# Patient Record
Sex: MALE | Race: WHITE | URBAN - METROPOLITAN AREA
[De-identification: names, ages, dates, MRNs, and addresses within clinical notes are randomized per-mention and may not be internally consistent; named-entity substitution may affect disease eponyms.]

---

## 2020-12-17 ENCOUNTER — OFFICE VISIT (OUTPATIENT)
Dept: URBAN - METROPOLITAN AREA CLINIC 27 | Facility: CLINIC | Age: 60
End: 2020-12-17
Payer: MEDICARE

## 2020-12-17 DIAGNOSIS — H35.371 PUCKERING OF MACULA, RIGHT EYE: ICD-10-CM

## 2020-12-17 PROCEDURE — 67028 INJECTION EYE DRUG: CPT | Performed by: OPHTHALMOLOGY

## 2020-12-17 PROCEDURE — 92012 INTRM OPH EXAM EST PATIENT: CPT | Performed by: OPHTHALMOLOGY

## 2020-12-17 PROCEDURE — 92134 CPTRZ OPH DX IMG PST SGM RTA: CPT | Performed by: OPHTHALMOLOGY

## 2020-12-17 ASSESSMENT — INTRAOCULAR PRESSURE
OD: 17
OS: 18

## 2021-03-18 ENCOUNTER — OFFICE VISIT (OUTPATIENT)
Dept: URBAN - METROPOLITAN AREA CLINIC 27 | Facility: CLINIC | Age: 61
End: 2021-03-18
Payer: MEDICARE

## 2021-03-18 PROCEDURE — 67028 INJECTION EYE DRUG: CPT | Performed by: OPHTHALMOLOGY

## 2021-03-18 PROCEDURE — 99213 OFFICE O/P EST LOW 20 MIN: CPT | Performed by: OPHTHALMOLOGY

## 2021-03-18 PROCEDURE — 92134 CPTRZ OPH DX IMG PST SGM RTA: CPT | Performed by: OPHTHALMOLOGY

## 2021-03-18 ASSESSMENT — INTRAOCULAR PRESSURE
OS: 15
OD: 14

## 2021-03-18 NOTE — IMPRESSION/PLAN
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
s/p Avastin x 11 OS, last 12/17/2020
s/p PRP fill in OS 06/06/19 Plan: Exam and OCT confirm persistent center-involved DME OS s/p Avastin x 11. CRT increased to 413 microns (from 401 microns). PRP OS complete. There is stable, mild DME OD. The diagnosis, natural history, and prognosis of PDR with DME, as well as the R/B/A of PRP, anti-VEGF, vitrectomy, and observation were discussed. The patient elects Avastin OS - performed without complication. BS/BP/lipid control discussed. Last A1C 7.9 RTC 3 months for OCT OU re-eval

## 2021-06-15 ENCOUNTER — OFFICE VISIT (OUTPATIENT)
Dept: URBAN - METROPOLITAN AREA CLINIC 27 | Facility: CLINIC | Age: 61
End: 2021-06-15
Payer: MEDICARE

## 2021-06-15 PROCEDURE — 92134 CPTRZ OPH DX IMG PST SGM RTA: CPT | Performed by: OPHTHALMOLOGY

## 2021-06-15 PROCEDURE — 67028 INJECTION EYE DRUG: CPT | Performed by: OPHTHALMOLOGY

## 2021-06-15 PROCEDURE — 99214 OFFICE O/P EST MOD 30 MIN: CPT | Performed by: OPHTHALMOLOGY

## 2021-06-15 ASSESSMENT — INTRAOCULAR PRESSURE
OS: 14
OD: 13

## 2021-06-15 NOTE — IMPRESSION/PLAN
Impression: Type 2 diab with prolif diab rtnop with macular edema, bi: [id number] OU.
s/p Avastin x 12 OS, last 3/18/21
s/p PRP fill in OS 06/06/19 Plan: Exam and OCT confirm mild VH OS and worsening center-involved DME OS s/p Avastin x 11. CRT increased to 459 microns (from 413 microns). PRP OS complete. There is stable, mild DME OD. The diagnosis, natural history, and prognosis of PDR with DME, as well as the R/B/A of PRP, anti-VEGF, vitrectomy, and observation were discussed. The patient elects Avastin OS - performed without complication. BS/BP/lipid control discussed. Last A1C 7.9 RTC 2 months for OCT OU re-eval Avastin

## 2021-08-10 ENCOUNTER — OFFICE VISIT (OUTPATIENT)
Dept: URBAN - METROPOLITAN AREA CLINIC 27 | Facility: CLINIC | Age: 61
End: 2021-08-10
Payer: COMMERCIAL

## 2021-08-10 DIAGNOSIS — H25.13 AGE-RELATED NUCLEAR CATARACT, BILATERAL: ICD-10-CM

## 2021-08-10 DIAGNOSIS — E11.3513 TYPE 2 DIABETES MELLITUS WITH PROLIFERATIVE DIABETIC RETINOPATHY WITH MACULAR EDEMA, BILATERAL: Primary | ICD-10-CM

## 2021-08-10 PROCEDURE — 92134 CPTRZ OPH DX IMG PST SGM RTA: CPT | Performed by: OPHTHALMOLOGY

## 2021-08-10 PROCEDURE — 67028 INJECTION EYE DRUG: CPT | Performed by: OPHTHALMOLOGY

## 2021-08-10 ASSESSMENT — INTRAOCULAR PRESSURE
OS: 15
OD: 14

## 2021-08-10 NOTE — IMPRESSION/PLAN
Impression: Type 2 diab with prolif diab rtnop with macular edema, bi: [id number] OU.
s/p Avastin x 13 OS, last 6/15/21
s/p PRP fill in OS 06/06/19
last full DFE OU 6/15/21 Plan: Exam and OCT confirm improving VH OS and persistent, center-involved DME OS s/p Avastin x 11.  microns (from 459 microns). PRP OS complete. There is stable, mild DME OD. The diagnosis, natural history, and prognosis of PDR with DME, as well as the R/B/A of PRP, anti-VEGF, vitrectomy, and observation were discussed. The patient elects Avastin OS - performed without complication. If no improvement at follow-up, will try Eylea. BS/BP/lipid control discussed. Last A1C 7.5 RTC 7 weeks for OCT OU re-eval Avastin vs Eylea

## 2021-09-30 ENCOUNTER — OFFICE VISIT (OUTPATIENT)
Dept: URBAN - METROPOLITAN AREA CLINIC 41 | Facility: CLINIC | Age: 61
End: 2021-09-30
Payer: COMMERCIAL

## 2021-09-30 PROCEDURE — 92134 CPTRZ OPH DX IMG PST SGM RTA: CPT | Performed by: OPHTHALMOLOGY

## 2021-09-30 PROCEDURE — 67028 INJECTION EYE DRUG: CPT | Performed by: OPHTHALMOLOGY

## 2021-09-30 ASSESSMENT — INTRAOCULAR PRESSURE
OD: 10
OS: 15

## 2021-09-30 NOTE — IMPRESSION/PLAN
Impression: Type 2 diab with prolif diab rtnop with macular edema, bi: [id number] OU.
s/p Avastin x 14 OS, last 8/10/21
s/p PRP fill in OS 06/06/19
last full DFE OU 6/15/21 Plan: Exam and OCT confirm recurrent VH and new-onset [place] OS. There is persistent, center-involved DME OS s/p Avastin x 14.  microns (stable). PRP OS complete. There is stable, mild DME OD. The diagnosis, natural history, and prognosis of PDR with DME, as well as the R/B/A of PRP, anti-VEGF, vitrectomy, and observation were discussed. The patient elects Avastin OS - performed without complication. If no improvement at follow-up, will try Eylea. BS/BP/lipid control discussed. Last A1C 7.4 RTC 5 weeks for OCT OU re-eval Avastin vs Eylea

## 2021-11-11 ENCOUNTER — OFFICE VISIT (OUTPATIENT)
Dept: URBAN - METROPOLITAN AREA CLINIC 41 | Facility: CLINIC | Age: 61
End: 2021-11-11
Payer: COMMERCIAL

## 2021-11-11 PROCEDURE — 67028 INJECTION EYE DRUG: CPT | Performed by: OPHTHALMOLOGY

## 2021-11-11 PROCEDURE — 92134 CPTRZ OPH DX IMG PST SGM RTA: CPT | Performed by: OPHTHALMOLOGY

## 2021-11-11 ASSESSMENT — INTRAOCULAR PRESSURE
OD: 12
OS: 17

## 2021-11-11 NOTE — IMPRESSION/PLAN
Impression: Type 2 diab with prolif diab rtnop with macular edema, bi: [id number] OU.
s/p Avastin x 15 OS, last 9/30/21
s/p PRP fill in OS 06/06/19
last full DFE OU 6/15/21 Plan: Exam and OCT confirm improving VH and stable [place] OS. There is persistent, center-involved DME OS s/p Avastin x 15.  (from 465 microns). PRP OS complete. There is stable, mild DME OD. The diagnosis, natural history, and prognosis of PDR with DME, as well as the R/B/A of PRP, anti-VEGF, vitrectomy, and observation were discussed. The patient elects Eylea #1 OS - performed without complication. BS/BP/lipid control discussed. Last A1C 7.4 RTC 6 weeks for OCT OU re-eval Avastin vs Eylea, DFE OU (semiannual)

## 2021-12-23 ENCOUNTER — OFFICE VISIT (OUTPATIENT)
Dept: URBAN - METROPOLITAN AREA CLINIC 41 | Facility: CLINIC | Age: 61
End: 2021-12-23
Payer: COMMERCIAL

## 2021-12-23 PROCEDURE — 92134 CPTRZ OPH DX IMG PST SGM RTA: CPT | Performed by: OPHTHALMOLOGY

## 2021-12-23 PROCEDURE — 67028 INJECTION EYE DRUG: CPT | Performed by: OPHTHALMOLOGY

## 2021-12-23 ASSESSMENT — INTRAOCULAR PRESSURE
OD: 17
OS: 14

## 2021-12-23 NOTE — IMPRESSION/PLAN
Impression: Type 2 diab with prolif diab rtnop with macular edema, bi: [id number] OU.
s/p Avastin x 15 OS, last 9/30/21
s/p PRP fill in OS 06/06/19
s/p Eylea x 1 OS, last 11/11/21
last full DFE OU 6/15/21 Plan: Both eyes are due for full dilated semiannual exam today. Exam and OCT confirm improving VH and [place] OS. There is improving, center-involved DME OS s/p Eylea x 1.  (from 405 microns). PRP OS complete. There is stable, mild DME OD. The diagnosis, natural history, and prognosis of PDR with DME, as well as the R/B/A of PRP, anti-VEGF, vitrectomy, and observation were discussed. The patient elects Eylea #2 OS - performed without complication. BS/BP/lipid control discussed. Last A1C 7.1 RTC 6 weeks for OCT OU Eylea OS #2/3 (straight) (new series)

## 2022-02-03 ENCOUNTER — PROCEDURE (OUTPATIENT)
Dept: URBAN - METROPOLITAN AREA CLINIC 41 | Facility: CLINIC | Age: 62
End: 2022-02-03
Payer: COMMERCIAL

## 2022-02-03 PROCEDURE — 67028 INJECTION EYE DRUG: CPT | Performed by: OPHTHALMOLOGY

## 2022-02-03 PROCEDURE — 92134 CPTRZ OPH DX IMG PST SGM RTA: CPT | Performed by: OPHTHALMOLOGY

## 2022-02-03 ASSESSMENT — INTRAOCULAR PRESSURE
OS: 15
OD: 14

## 2022-02-03 NOTE — IMPRESSION/PLAN
Impression: Type 2 diab with prolif diab rtnop with macular edema, bi: [id number] OU.
s/p Avastin x 15 OS, last 9/30/21
s/p PRP fill in OS 06/06/19
s/p Eylea x 2 OS, last 12/23/21 Plan: OCT: improving, center-involved DME OS s/p Eylea x 1.  (from 405 microns). PRP OS complete. There is stable, mild DME OD. The diagnosis, natural history, and prognosis of PDR with DME, as well as the R/B/A of PRP, anti-VEGF, vitrectomy, and observation were discussed. The patient elects Eylea #3 OS - performed without complication. BS/BP/lipid control discussed. Last A1C 7.1 RTC 6 weeks for OCT OU Eylea OS #3/3 (straight) (new series)

## 2022-03-17 ENCOUNTER — PROCEDURE (OUTPATIENT)
Dept: URBAN - METROPOLITAN AREA CLINIC 41 | Facility: CLINIC | Age: 62
End: 2022-03-17
Payer: COMMERCIAL

## 2022-03-17 PROCEDURE — 92134 CPTRZ OPH DX IMG PST SGM RTA: CPT | Performed by: OPHTHALMOLOGY

## 2022-03-17 PROCEDURE — 67028 INJECTION EYE DRUG: CPT | Performed by: OPHTHALMOLOGY

## 2022-03-17 ASSESSMENT — INTRAOCULAR PRESSURE
OD: 14
OS: 13

## 2022-03-17 NOTE — IMPRESSION/PLAN
Impression: Type 2 diab with prolif diab rtnop with macular edema, bi: [id number] OU.
s/p Avastin x 15 OS, last 9/30/21
s/p PRP fill in OS 06/06/19
s/p Eylea x 3 OS, last 2/3/22 Plan: OCT: trace DME OS s/p Eylea x 3.  (from 354 microns). PRP OS complete. There is stable, mild DME OD. The diagnosis, natural history, and prognosis of PDR with DME, as well as the R/B/A of PRP, anti-VEGF, vitrectomy, and observation were discussed. The patient elects Eylea #4 OS - performed without complication. BS/BP/lipid control discussed. Last A1C 7.1 RTC 8 weeks for OCT OU, re-eval for Eylea OS, DFE OU (semiannual)

## 2022-06-14 ENCOUNTER — OFFICE VISIT (OUTPATIENT)
Dept: URBAN - METROPOLITAN AREA CLINIC 27 | Facility: CLINIC | Age: 62
End: 2022-06-14
Payer: COMMERCIAL

## 2022-06-14 DIAGNOSIS — H35.371 PUCKERING OF MACULA, RIGHT EYE: ICD-10-CM

## 2022-06-14 DIAGNOSIS — E11.3513 TYPE 2 DIAB WITH PROLIF DIAB RTNOP WITH MACULAR EDEMA, BI: Primary | ICD-10-CM

## 2022-06-14 DIAGNOSIS — H25.13 AGE-RELATED NUCLEAR CATARACT, BILATERAL: ICD-10-CM

## 2022-06-14 PROCEDURE — 92134 CPTRZ OPH DX IMG PST SGM RTA: CPT | Performed by: OPHTHALMOLOGY

## 2022-06-14 PROCEDURE — 67028 INJECTION EYE DRUG: CPT | Performed by: OPHTHALMOLOGY

## 2022-06-14 PROCEDURE — 92014 COMPRE OPH EXAM EST PT 1/>: CPT | Performed by: OPHTHALMOLOGY

## 2022-06-14 ASSESSMENT — INTRAOCULAR PRESSURE
OD: 15
OS: 16

## 2022-06-14 NOTE — IMPRESSION/PLAN
Impression: Type 2 diab with prolif diab rtnop with macular edema, bi: [id number] OU.
s/p Avastin x 15 OS, last 9/30/21
s/p PRP fill in OS 06/06/19
s/p Eylea x 4 OS, last 3/17/22 Plan: Pt lost to f/u for 3 months. Both eyes are due for full dilated exam today. Exam and OCT show worsening DME OS s/p Eylea 3 months ago.  microns (from 358 microns). PRP OS complete. There is stable, mild DME OD. The diagnosis, natural history, and prognosis of PDR with DME, as well as the R/B/A of PRP, anti-VEGF, vitrectomy, and observation were discussed. The patient elects Eylea #5 OS - performed without complication. BS/BP/lipid control discussed. Last A1C 7.8 RTC 8 weeks for OCT OU Eylea OS #2/3 straight

## 2022-08-04 ENCOUNTER — PROCEDURE (OUTPATIENT)
Dept: URBAN - METROPOLITAN AREA CLINIC 41 | Facility: CLINIC | Age: 62
End: 2022-08-04
Payer: COMMERCIAL

## 2022-08-04 DIAGNOSIS — E11.3513 TYPE 2 DIAB WITH PROLIF DIAB RTNOP WITH MACULAR EDEMA, BI: Primary | ICD-10-CM

## 2022-08-04 PROCEDURE — 67028 INJECTION EYE DRUG: CPT | Performed by: OPHTHALMOLOGY

## 2022-08-04 PROCEDURE — 92134 CPTRZ OPH DX IMG PST SGM RTA: CPT | Performed by: OPHTHALMOLOGY

## 2022-08-04 ASSESSMENT — INTRAOCULAR PRESSURE
OD: 8
OS: 12

## 2022-08-04 NOTE — IMPRESSION/PLAN
Impression: Type 2 diab with prolif diab rtnop with macular edema, bi: [id number] OU.
s/p Avastin x 15 OS, last 9/30/21
s/p PRP fill in OS 06/06/19
s/p Eylea x 5 OS, last 06/14/22 Plan: OCT: improving DME OS s/p Eylea 7 weeks ago.  microns (from 396 microns). PRP OS complete. There is stable, mild DME OD. The diagnosis, natural history, and prognosis of PDR with DME, as well as the R/B/A of PRP, anti-VEGF, vitrectomy, and observation were discussed. The patient elects Eylea #6 OS - performed without complication. BS/BP/lipid control discussed. Last A1C 7.8 RTC 8 weeks for OCT OU Eylea OS #3/3 straight

## 2022-10-06 ENCOUNTER — OFFICE VISIT (OUTPATIENT)
Dept: URBAN - METROPOLITAN AREA CLINIC 27 | Facility: CLINIC | Age: 62
End: 2022-10-06
Payer: COMMERCIAL

## 2022-10-06 DIAGNOSIS — E11.3513 TYPE 2 DIAB WITH PROLIF DIAB RTNOP WITH MACULAR EDEMA, BI: Primary | ICD-10-CM

## 2022-10-06 PROCEDURE — 67028 INJECTION EYE DRUG: CPT | Performed by: OPHTHALMOLOGY

## 2022-10-06 PROCEDURE — 92134 CPTRZ OPH DX IMG PST SGM RTA: CPT | Performed by: OPHTHALMOLOGY

## 2022-10-06 ASSESSMENT — INTRAOCULAR PRESSURE
OD: 16
OS: 19

## 2022-10-06 NOTE — IMPRESSION/PLAN
Impression: Type 2 diab with prolif diab rtnop with macular edema, bi: [id number] OU.
s/p Avastin x 15 OS, last 9/30/21
s/p PRP fill in OS 06/06/19
s/p Eylea x 6 OS, last 08/04/22 Plan: OCT: persistent, center-involved DME OS s/p Eylea 8 weeks ago. PRP OS complete. There is stable, mild DME OD. The diagnosis, natural history, and prognosis of PDR with DME, as well as the R/B/A of PRP, anti-VEGF, vitrectomy, and observation were discussed. The patient elects Eylea #7 OS - performed without complication. BS/BP/lipid control discussed. Last A1C 7.8 RTC 8 weeks for OCT OU re-eval Eylea

## 2022-12-01 ENCOUNTER — OFFICE VISIT (OUTPATIENT)
Dept: URBAN - METROPOLITAN AREA CLINIC 27 | Facility: CLINIC | Age: 62
End: 2022-12-01
Payer: COMMERCIAL

## 2022-12-01 DIAGNOSIS — H25.13 AGE-RELATED NUCLEAR CATARACT, BILATERAL: ICD-10-CM

## 2022-12-01 DIAGNOSIS — H35.371 PUCKERING OF MACULA, RIGHT EYE: ICD-10-CM

## 2022-12-01 DIAGNOSIS — E11.3513 TYPE 2 DIABETES MELLITUS WITH PROLIFERATIVE DIABETIC RETINOPATHY WITH MACULAR EDEMA, BILATERAL: Primary | ICD-10-CM

## 2022-12-01 PROCEDURE — 92014 COMPRE OPH EXAM EST PT 1/>: CPT | Performed by: OPHTHALMOLOGY

## 2022-12-01 PROCEDURE — 67028 INJECTION EYE DRUG: CPT | Performed by: OPHTHALMOLOGY

## 2022-12-01 PROCEDURE — 92134 CPTRZ OPH DX IMG PST SGM RTA: CPT | Performed by: OPHTHALMOLOGY

## 2022-12-01 ASSESSMENT — INTRAOCULAR PRESSURE
OD: 18
OS: 19

## 2022-12-01 NOTE — IMPRESSION/PLAN
Impression: Type 2 diab with prolif diab rtnop with macular edema, bi: [id number] OU.
s/p Avastin x 15 OS, last 9/30/21
s/p PRP fill in OS 06/06/19
s/p Eylea x 7 OS, last 10/06/22 Plan: Exam/OCT reveal improving DME OS s/p Eylea 8 weeks ago. PRP OS complete. There is stable, mild DME OD. The diagnosis, natural history, and prognosis of PDR with DME, as well as the R/B/A of PRP, anti-VEGF, vitrectomy, and observation were discussed. The patient elects Eylea #8 OS - performed without complication. BS/BP/lipid control discussed. Last A1C 7.8 RTC 8 weeks for OCT OU Eylea OS #2/3 straight

## 2023-01-26 ENCOUNTER — PROCEDURE (OUTPATIENT)
Dept: URBAN - METROPOLITAN AREA CLINIC 27 | Facility: CLINIC | Age: 63
End: 2023-01-26
Payer: COMMERCIAL

## 2023-01-26 DIAGNOSIS — E11.3513 TYPE 2 DIAB WITH PROLIF DIAB RTNOP WITH MACULAR EDEMA, BI: Primary | ICD-10-CM

## 2023-01-26 PROCEDURE — 92134 CPTRZ OPH DX IMG PST SGM RTA: CPT | Performed by: OPHTHALMOLOGY

## 2023-01-26 PROCEDURE — 67028 INJECTION EYE DRUG: CPT | Performed by: OPHTHALMOLOGY

## 2023-01-26 ASSESSMENT — INTRAOCULAR PRESSURE
OS: 11
OD: 10

## 2023-01-26 NOTE — IMPRESSION/PLAN
Impression: Type 2 diab with prolif diab rtnop with macular edema, bi: [id number] OU.
s/p Avastin x 15 OS, last 9/30/21
s/p PRP fill in OS 06/06/19
s/p Eylea x 8 OS, last 12/01/22 Plan: Exam/OCT show mild, stable DME OS s/p Eylea 8 weeks ago. PRP OS complete. There is stable, mild DME OD. The diagnosis, natural history, and prognosis of PDR with DME, as well as the R/B/A of PRP, anti-VEGF, vitrectomy, and observation were discussed. The patient elects Eylea #9 OS - performed without complication. BS/BP/lipid control discussed. Last A1C 7.8 RTC 10 weeks for OCT OU Eylea OS #3/3 straight

## 2023-05-04 ENCOUNTER — OFFICE VISIT (OUTPATIENT)
Dept: URBAN - METROPOLITAN AREA CLINIC 27 | Facility: CLINIC | Age: 63
End: 2023-05-04
Payer: COMMERCIAL

## 2023-05-04 DIAGNOSIS — E11.3513 TYPE 2 DIABETES MELLITUS WITH PROLIFERATIVE DIABETIC RETINOPATHY WITH MACULAR EDEMA, BILATERAL: Primary | ICD-10-CM

## 2023-05-04 DIAGNOSIS — H35.371 PUCKERING OF MACULA, RIGHT EYE: ICD-10-CM

## 2023-05-04 DIAGNOSIS — H25.13 AGE-RELATED NUCLEAR CATARACT, BILATERAL: ICD-10-CM

## 2023-05-04 PROCEDURE — 99213 OFFICE O/P EST LOW 20 MIN: CPT | Performed by: OPHTHALMOLOGY

## 2023-05-04 PROCEDURE — 92134 CPTRZ OPH DX IMG PST SGM RTA: CPT | Performed by: OPHTHALMOLOGY

## 2023-05-04 ASSESSMENT — INTRAOCULAR PRESSURE
OD: 9
OS: 18

## 2023-05-04 NOTE — IMPRESSION/PLAN
Impression: Type 2 diab with prolif diab rtnop with macular edema, bi: [id number] OU.
s/p Avastin x 15 OS, last 9/30/21
s/p PRP fill in OS 06/06/19
s/p Eylea x 9 OS, last 01/26/23 Plan: Pt has been lost to follow-up for 3.5 months. Exam/OCT show mild, stable DME OU. PRP OU complete. The diagnosis, natural history, and prognosis of PDR with DME, as well as the R/B/A of PRP, anti-VEGF, vitrectomy, and observation were discussed. The patient elects observation OU given stability. BS/BP/lipid control discussed. Last A1C 7.8 RTC 12 weeks for OCT OU re-eval for [facility]

## 2023-08-03 ENCOUNTER — OFFICE VISIT (OUTPATIENT)
Dept: URBAN - METROPOLITAN AREA CLINIC 41 | Facility: CLINIC | Age: 63
End: 2023-08-03
Payer: COMMERCIAL

## 2023-08-03 DIAGNOSIS — H35.371 PUCKERING OF MACULA, RIGHT EYE: ICD-10-CM

## 2023-08-03 DIAGNOSIS — H25.13 AGE-RELATED NUCLEAR CATARACT, BILATERAL: ICD-10-CM

## 2023-08-03 DIAGNOSIS — E11.3513 TYPE 2 DIAB WITH PROLIF DIAB RTNOP WITH MACULAR EDEMA, BI: Primary | ICD-10-CM

## 2023-08-03 PROCEDURE — 92134 CPTRZ OPH DX IMG PST SGM RTA: CPT | Performed by: OPHTHALMOLOGY

## 2023-08-03 PROCEDURE — 99213 OFFICE O/P EST LOW 20 MIN: CPT | Performed by: OPHTHALMOLOGY

## 2023-08-03 ASSESSMENT — INTRAOCULAR PRESSURE
OS: 19
OD: 17

## 2023-09-06 NOTE — IMPRESSION/PLAN
Impression: Age-related nuclear cataract, bilateral: H25.13. OU.  Plan: Observe
Impression: Puckering of macula, right eye: H35.371. OD. Plan: Mild on exam and OCT.   Observe
Impression: Type 2 diab with prolif diab rtnop with macular edema, bi: [id number] OU.
s/p Avastin x 10 OS, last 6/11/2020
s/p PRP fill in OS 06/06/19 Plan: Exam and OCT confirm  worsening center-involved DME OS s/p Avastin x 10. CRT increased to 401 microns (from 382 microns). PRP OS complete. There is stable, mild DME OD. The diagnosis, natural history, and prognosis of PDR with DME, as well as the R/B/A of PRP, anti-VEGF, vitrectomy, and observation were discussed. The patient elects Avastin OS - performed without complication. BS/BP/lipid control discussed. Last A1C 7.8 RTC 3 months for OCT OU re-eval
Primary

## 2024-01-05 ENCOUNTER — OFFICE VISIT (OUTPATIENT)
Dept: URBAN - METROPOLITAN AREA CLINIC 23 | Facility: CLINIC | Age: 64
End: 2024-01-05
Payer: COMMERCIAL

## 2024-01-05 PROCEDURE — 99204 OFFICE O/P NEW MOD 45 MIN: CPT | Performed by: OPHTHALMOLOGY

## 2024-01-05 RX ORDER — PREDNISOLONE ACETATE 10 MG/ML
1 % SUSPENSION/ DROPS OPHTHALMIC
Qty: 10 | Refills: 1 | Status: ACTIVE
Start: 2024-01-05

## 2024-01-05 RX ORDER — TOBRAMYCIN 3 MG/ML
0.3 % SOLUTION OPHTHALMIC
Qty: 5 | Refills: 1 | Status: ACTIVE
Start: 2024-01-05

## 2024-01-05 ASSESSMENT — INTRAOCULAR PRESSURE
OS: 19
OD: 19

## 2024-01-05 ASSESSMENT — KERATOMETRY
OS: 42.88
OD: 42.75

## 2024-01-05 ASSESSMENT — VISUAL ACUITY
OD: 20/40
OS: 20/50

## 2024-01-22 ENCOUNTER — TECH ONLY (OUTPATIENT)
Dept: URBAN - METROPOLITAN AREA CLINIC 23 | Facility: CLINIC | Age: 64
End: 2024-01-22
Payer: COMMERCIAL

## 2024-01-22 DIAGNOSIS — H25.813 COMBINED FORMS OF AGE-RELATED CATARACT, BILATERAL: Primary | ICD-10-CM

## 2024-01-22 ASSESSMENT — PACHYMETRY
OD: 25.60
OS: 2.68
OS: 25.76
OD: 2.73

## 2024-01-31 ENCOUNTER — SURGERY (OUTPATIENT)
Dept: URBAN - METROPOLITAN AREA SURGERY 11 | Facility: SURGERY | Age: 64
End: 2024-01-31
Payer: COMMERCIAL

## 2024-01-31 PROCEDURE — 66984 XCAPSL CTRC RMVL W/O ECP: CPT | Performed by: OPHTHALMOLOGY

## 2024-02-01 ENCOUNTER — POST-OPERATIVE VISIT (OUTPATIENT)
Dept: URBAN - METROPOLITAN AREA CLINIC 23 | Facility: CLINIC | Age: 64
End: 2024-02-01
Payer: COMMERCIAL

## 2024-02-01 PROCEDURE — 99024 POSTOP FOLLOW-UP VISIT: CPT | Performed by: OPTOMETRIST

## 2024-02-01 ASSESSMENT — INTRAOCULAR PRESSURE: OS: 22

## 2024-02-07 ENCOUNTER — POST-OPERATIVE VISIT (OUTPATIENT)
Dept: URBAN - METROPOLITAN AREA CLINIC 23 | Facility: CLINIC | Age: 64
End: 2024-02-07
Payer: COMMERCIAL

## 2024-02-07 DIAGNOSIS — Z48.810 ENCOUNTER FOR SURGICAL AFTERCARE FOLLOWING SURGERY ON A SENSE ORGAN: Primary | ICD-10-CM

## 2024-02-07 PROCEDURE — 99024 POSTOP FOLLOW-UP VISIT: CPT | Performed by: OPTOMETRIST

## 2024-02-07 ASSESSMENT — INTRAOCULAR PRESSURE: OS: 20

## 2024-02-08 ENCOUNTER — OFFICE VISIT (OUTPATIENT)
Dept: URBAN - METROPOLITAN AREA CLINIC 27 | Facility: CLINIC | Age: 64
End: 2024-02-08
Payer: COMMERCIAL

## 2024-02-08 DIAGNOSIS — H25.11 AGE-RELATED NUCLEAR CATARACT, RIGHT EYE: ICD-10-CM

## 2024-02-08 DIAGNOSIS — E11.3513 TYPE 2 DIABETES MELLITUS WITH PROLIFERATIVE DIABETIC RETINOPATHY WITH MACULAR EDEMA, BILATERAL: Primary | ICD-10-CM

## 2024-02-08 DIAGNOSIS — H35.371 PUCKERING OF MACULA, RIGHT EYE: ICD-10-CM

## 2024-02-08 PROCEDURE — 92014 COMPRE OPH EXAM EST PT 1/>: CPT | Performed by: OPHTHALMOLOGY

## 2024-02-08 PROCEDURE — 92134 CPTRZ OPH DX IMG PST SGM RTA: CPT | Performed by: OPHTHALMOLOGY

## 2024-02-08 ASSESSMENT — INTRAOCULAR PRESSURE
OD: 12
OS: 17

## 2024-02-14 ENCOUNTER — SURGERY (OUTPATIENT)
Dept: URBAN - METROPOLITAN AREA SURGERY 11 | Facility: SURGERY | Age: 64
End: 2024-02-14
Payer: COMMERCIAL

## 2024-02-14 PROCEDURE — 66984 XCAPSL CTRC RMVL W/O ECP: CPT | Performed by: OPHTHALMOLOGY

## 2024-02-15 ENCOUNTER — POST-OPERATIVE VISIT (OUTPATIENT)
Dept: URBAN - METROPOLITAN AREA CLINIC 23 | Facility: CLINIC | Age: 64
End: 2024-02-15
Payer: COMMERCIAL

## 2024-02-15 DIAGNOSIS — Z96.1 PRESENCE OF INTRAOCULAR LENS: Primary | ICD-10-CM

## 2024-02-15 PROCEDURE — 99024 POSTOP FOLLOW-UP VISIT: CPT | Performed by: OPTOMETRIST

## 2024-02-15 ASSESSMENT — INTRAOCULAR PRESSURE
OD: 28
OS: 20

## 2024-02-21 ENCOUNTER — POST-OPERATIVE VISIT (OUTPATIENT)
Dept: URBAN - METROPOLITAN AREA CLINIC 23 | Facility: CLINIC | Age: 64
End: 2024-02-21
Payer: COMMERCIAL

## 2024-02-21 DIAGNOSIS — Z96.1 PRESENCE OF INTRAOCULAR LENS: Primary | ICD-10-CM

## 2024-02-21 PROCEDURE — 99024 POSTOP FOLLOW-UP VISIT: CPT | Performed by: OPTOMETRIST

## 2024-02-21 ASSESSMENT — INTRAOCULAR PRESSURE
OD: 20
OS: 20

## 2024-02-21 ASSESSMENT — KERATOMETRY
OD: 42.75
OS: 43.25

## 2024-03-28 ENCOUNTER — POST-OPERATIVE VISIT (OUTPATIENT)
Dept: URBAN - METROPOLITAN AREA CLINIC 23 | Facility: CLINIC | Age: 64
End: 2024-03-28
Payer: COMMERCIAL

## 2024-03-28 DIAGNOSIS — Z96.1 PRESENCE OF INTRAOCULAR LENS: Primary | ICD-10-CM

## 2024-03-28 PROCEDURE — 99024 POSTOP FOLLOW-UP VISIT: CPT | Performed by: OPTOMETRIST

## 2024-03-28 ASSESSMENT — KERATOMETRY
OD: 43.38
OS: 43.38

## 2024-03-28 ASSESSMENT — INTRAOCULAR PRESSURE
OD: 20
OS: 20

## 2024-08-08 ENCOUNTER — OFFICE VISIT (OUTPATIENT)
Dept: URBAN - METROPOLITAN AREA CLINIC 27 | Facility: CLINIC | Age: 64
End: 2024-08-08
Payer: COMMERCIAL

## 2024-08-08 DIAGNOSIS — H35.371 PUCKERING OF MACULA, RIGHT EYE: ICD-10-CM

## 2024-08-08 DIAGNOSIS — Z96.1 PRESENCE OF INTRAOCULAR LENS: ICD-10-CM

## 2024-08-08 DIAGNOSIS — E11.3513 TYPE 2 DIABETES MELLITUS WITH PROLIFERATIVE DIABETIC RETINOPATHY WITH MACULAR EDEMA, BILATERAL: Primary | ICD-10-CM

## 2024-08-08 PROCEDURE — 99214 OFFICE O/P EST MOD 30 MIN: CPT | Performed by: OPHTHALMOLOGY

## 2024-08-08 PROCEDURE — 92235 FLUORESCEIN ANGRPH MLTIFRAME: CPT | Performed by: OPHTHALMOLOGY

## 2024-08-08 PROCEDURE — 67028 INJECTION EYE DRUG: CPT | Performed by: OPHTHALMOLOGY

## 2024-08-08 PROCEDURE — 92250 FUNDUS PHOTOGRAPHY W/I&R: CPT | Performed by: OPHTHALMOLOGY

## 2024-08-08 PROCEDURE — 92134 CPTRZ OPH DX IMG PST SGM RTA: CPT | Performed by: OPHTHALMOLOGY

## 2024-08-08 ASSESSMENT — INTRAOCULAR PRESSURE
OD: 20
OS: 26

## 2024-09-18 ENCOUNTER — OFFICE VISIT (OUTPATIENT)
Dept: URBAN - METROPOLITAN AREA CLINIC 27 | Facility: CLINIC | Age: 64
End: 2024-09-18
Payer: COMMERCIAL

## 2024-09-18 DIAGNOSIS — E11.3513 TYPE 2 DIABETES MELLITUS WITH PROLIFERATIVE DIABETIC RETINOPATHY WITH MACULAR EDEMA, BILATERAL: Primary | ICD-10-CM

## 2024-09-18 PROCEDURE — 67228 TREATMENT X10SV RETINOPATHY: CPT | Performed by: OPHTHALMOLOGY

## 2024-09-18 ASSESSMENT — INTRAOCULAR PRESSURE
OS: 17
OD: 12

## 2024-12-18 ENCOUNTER — OFFICE VISIT (OUTPATIENT)
Dept: URBAN - METROPOLITAN AREA CLINIC 27 | Facility: CLINIC | Age: 64
End: 2024-12-18
Payer: MEDICARE

## 2024-12-18 DIAGNOSIS — E11.3513 TYPE 2 DIABETES MELLITUS WITH PROLIFERATIVE DIABETIC RETINOPATHY WITH MACULAR EDEMA, BILATERAL: Primary | ICD-10-CM

## 2024-12-18 DIAGNOSIS — Z96.1 PRESENCE OF INTRAOCULAR LENS: ICD-10-CM

## 2024-12-18 DIAGNOSIS — H35.371 PUCKERING OF MACULA, RIGHT EYE: ICD-10-CM

## 2024-12-18 PROCEDURE — 92235 FLUORESCEIN ANGRPH MLTIFRAME: CPT | Performed by: OPHTHALMOLOGY

## 2024-12-18 PROCEDURE — 92134 CPTRZ OPH DX IMG PST SGM RTA: CPT | Performed by: OPHTHALMOLOGY

## 2024-12-18 PROCEDURE — 92250 FUNDUS PHOTOGRAPHY W/I&R: CPT | Performed by: OPHTHALMOLOGY

## 2024-12-18 PROCEDURE — 99213 OFFICE O/P EST LOW 20 MIN: CPT | Performed by: OPHTHALMOLOGY

## 2024-12-18 ASSESSMENT — INTRAOCULAR PRESSURE
OD: 13
OS: 15

## 2025-05-14 ENCOUNTER — OFFICE VISIT (OUTPATIENT)
Dept: URBAN - METROPOLITAN AREA CLINIC 27 | Facility: CLINIC | Age: 65
End: 2025-05-14
Payer: MEDICARE

## 2025-05-14 DIAGNOSIS — H35.371 PUCKERING OF MACULA, RIGHT EYE: ICD-10-CM

## 2025-05-14 DIAGNOSIS — E11.3513 TYPE 2 DIABETES MELLITUS WITH PROLIFERATIVE DIABETIC RETINOPATHY WITH MACULAR EDEMA, BILATERAL: Primary | ICD-10-CM

## 2025-05-14 DIAGNOSIS — Z96.1 PRESENCE OF INTRAOCULAR LENS: ICD-10-CM

## 2025-05-14 PROCEDURE — 92134 CPTRZ OPH DX IMG PST SGM RTA: CPT | Performed by: OPHTHALMOLOGY

## 2025-05-14 PROCEDURE — 99214 OFFICE O/P EST MOD 30 MIN: CPT | Performed by: OPHTHALMOLOGY

## 2025-05-14 PROCEDURE — 67028 INJECTION EYE DRUG: CPT | Performed by: OPHTHALMOLOGY

## 2025-05-14 ASSESSMENT — INTRAOCULAR PRESSURE
OD: 14
OS: 16

## 2025-08-13 ENCOUNTER — OFFICE VISIT (OUTPATIENT)
Dept: URBAN - METROPOLITAN AREA CLINIC 27 | Facility: CLINIC | Age: 65
End: 2025-08-13
Payer: MEDICARE

## 2025-08-13 DIAGNOSIS — H35.371 PUCKERING OF MACULA, RIGHT EYE: ICD-10-CM

## 2025-08-13 DIAGNOSIS — Z96.1 PRESENCE OF INTRAOCULAR LENS: ICD-10-CM

## 2025-08-13 DIAGNOSIS — E11.3513 TYPE 2 DIABETES MELLITUS WITH PROLIFERATIVE DIABETIC RETINOPATHY WITH MACULAR EDEMA, BILATERAL: Primary | ICD-10-CM

## 2025-08-13 PROCEDURE — 92134 CPTRZ OPH DX IMG PST SGM RTA: CPT | Performed by: OPHTHALMOLOGY

## 2025-08-13 PROCEDURE — 99213 OFFICE O/P EST LOW 20 MIN: CPT | Performed by: OPHTHALMOLOGY

## 2025-08-13 PROCEDURE — 67028 INJECTION EYE DRUG: CPT | Performed by: OPHTHALMOLOGY

## 2025-08-13 ASSESSMENT — INTRAOCULAR PRESSURE
OS: 16
OD: 13